# Patient Record
Sex: FEMALE | Race: WHITE | URBAN - METROPOLITAN AREA
[De-identification: names, ages, dates, MRNs, and addresses within clinical notes are randomized per-mention and may not be internally consistent; named-entity substitution may affect disease eponyms.]

---

## 2017-02-10 NOTE — PATIENT DISCUSSION
POAG, OU: INTRAOCULAR PRESSURE IS WITHIN ACCEPTABLE LIMITS. PT INSTRUCTED TO CONTINUE timolol  AND RETURN FOR FOLLOW-UP AS SCHEDULED.

## 2017-04-21 ENCOUNTER — IMPORTED ENCOUNTER (OUTPATIENT)
Dept: URBAN - METROPOLITAN AREA CLINIC 1 | Facility: CLINIC | Age: 67
End: 2017-04-21

## 2017-04-21 PROBLEM — H25.813: Noted: 2017-04-21

## 2017-04-21 PROBLEM — H35.033: Noted: 2017-04-21

## 2017-04-21 PROBLEM — H04.123: Noted: 2017-04-21

## 2017-04-21 PROBLEM — H16.143: Noted: 2017-04-21

## 2017-04-21 PROCEDURE — 92014 COMPRE OPH EXAM EST PT 1/>: CPT

## 2017-04-21 NOTE — PATIENT DISCUSSION
1.  Cataract OU: Observe for now without intervention. The patient was advised to contact us if any change or worsening of vision2. FRANCESCA w/ PEK OU- Increase ATs TID OU Routinely. 3.  Hypertensive Retinopathy OU- Stable continue HTN ControlLetter to PCP Return for an appointment in 1 yr 27 with Dr. Shae Mace.

## 2017-06-05 NOTE — PATIENT DISCUSSION
POAG, OU: INTRAOCULAR PRESSURE IS WITHIN ACCEPTABLE LIMITS. PT INSTRUCTED TO CONTINUE TIMOLOL OU QAM AND RETURN FOR FOLLOW-UP AS SCHEDULED.

## 2017-08-11 NOTE — PATIENT DISCUSSION
New Prescription: Maxitrol (neomycin-polymyxin-dexameth): drops,suspension: 3.5-10,000-0.1 mg/mL-unit/mL-% 1 drop three times a day as directed into affected eye 08-

## 2017-08-18 NOTE — PATIENT DISCUSSION
Continue: Maxitrol (neomycin-polymyxin-dexameth): drops,suspension: 3.5-10,000-0.1 mg/mL-unit/mL-% 1 drop three times a day as directed into affected eye 08-

## 2018-03-22 NOTE — PATIENT DISCUSSION
New Prescription: Maxitrol (neomycin-polymyxin-dexameth): drops,suspension: 3.5-10,000-0.1 mg/mL-unit/mL-% 1 drop four times a day as directed into affected eye 03-

## 2018-04-13 NOTE — PATIENT DISCUSSION
Stopped Today: Maxitrol (neomycin-polymyxin-dexameth): drops,suspension: 3.5-10,000-0.1 mg/mL-unit/mL-% 1 drop four times a day as directed into affected eye 03-

## 2018-04-24 ENCOUNTER — IMPORTED ENCOUNTER (OUTPATIENT)
Dept: URBAN - METROPOLITAN AREA CLINIC 1 | Facility: CLINIC | Age: 68
End: 2018-04-24

## 2018-04-24 PROBLEM — H16.143: Noted: 2018-04-24

## 2018-04-24 PROBLEM — H04.123: Noted: 2018-04-24

## 2018-04-24 PROBLEM — H25.813: Noted: 2018-04-24

## 2018-04-24 PROCEDURE — 92014 COMPRE OPH EXAM EST PT 1/>: CPT

## 2018-04-24 NOTE — PATIENT DISCUSSION
1.  Cataract OU: Observe for now without intervention. The patient was advised to contact us if any change or worsening of vision2. FRANCESCA w/ resolved PEK OU- Cont ATs TID OU Routinely. Letter to PCP Return for an appointment in 1 yr 30 glare with Dr. Tk Matthews.

## 2018-07-05 NOTE — PATIENT DISCUSSION
EYELID LESION, OS:  RECOMMEND BIOPSY WITH SPECIMEN TO PATHOLOGY. RISKS/BENEFITS/ALTERNATIVES DISCUSSED WITH PATIENT.

## 2018-07-05 NOTE — PATIENT DISCUSSION
New Prescription: Maxitrol (neomycin-polymyxin-dexameth): ointment: 3.5-10,000-0.1 mg-unit/g-% 1 a thin layer at bedtime into left eye 07-

## 2018-07-24 NOTE — PATIENT DISCUSSION
POAG, OU: INTRAOCULAR PRESSURE IS WITHIN ACCEPTABLE LIMITS. PT INSTRUCTED TO CONTINUE TIMOLOL OU  AND RETURN FOR FOLLOW-UP AS SCHEDULED.

## 2018-09-10 NOTE — PATIENT DISCUSSION
NO CHANGES ON VISUAL FIELD. CONTINUE CURRENT THERAPY AT THIS TIME.   DO TESTING YEARLY AND FOLLOW UP AS SCHEDULED

## 2019-04-25 ENCOUNTER — IMPORTED ENCOUNTER (OUTPATIENT)
Dept: URBAN - METROPOLITAN AREA CLINIC 1 | Facility: CLINIC | Age: 69
End: 2019-04-25

## 2019-04-25 PROBLEM — H04.123: Noted: 2019-04-25

## 2019-04-25 PROBLEM — H10.45: Noted: 2019-04-25

## 2019-04-25 PROBLEM — H43.812: Noted: 2019-04-25

## 2019-04-25 PROBLEM — H25.813: Noted: 2019-04-25

## 2019-04-25 PROBLEM — H16.143: Noted: 2019-04-25

## 2019-04-25 PROCEDURE — 92014 COMPRE OPH EXAM EST PT 1/>: CPT

## 2019-04-25 PROCEDURE — 92015 DETERMINE REFRACTIVE STATE: CPT

## 2019-04-25 NOTE — PATIENT DISCUSSION
1.  Cataract OU: Observe for now without intervention. The patient was advised to contact us if any change or worsening of vision2. FRANCESCA w/ PEK OU- Recommend ATs TID OU routinely 3. Allergic Conjunctivitis OU -- The condition was  discussed with the patient. Avoidance of allergens and cool compresses were recommended. May use OTC Zaditor BID OU PRN for itching 4. PVD w/o Tear OS- RD precautions. MRX for glasses givenReturn for an appointment in 1 year 30/glare with Dr. Sharlene Howell.

## 2020-04-30 ENCOUNTER — IMPORTED ENCOUNTER (OUTPATIENT)
Dept: URBAN - METROPOLITAN AREA CLINIC 1 | Facility: CLINIC | Age: 70
End: 2020-04-30

## 2020-04-30 PROBLEM — H16.143: Noted: 2020-04-30

## 2020-04-30 PROBLEM — H10.45: Noted: 2020-04-30

## 2020-04-30 PROBLEM — H25.813: Noted: 2020-04-30

## 2020-04-30 PROBLEM — H43.812: Noted: 2020-04-30

## 2020-04-30 PROBLEM — H25.013: Noted: 2020-04-30

## 2020-04-30 PROBLEM — H04.123: Noted: 2020-04-30

## 2020-04-30 PROBLEM — H35.033: Noted: 2020-04-30

## 2020-04-30 PROCEDURE — 92014 COMPRE OPH EXAM EST PT 1/>: CPT

## 2020-04-30 NOTE — PATIENT DISCUSSION
1.  Cataract OU -- Observe for now without intervention. The patient was advised to contact us if any change or worsening of vision2. FRANCESCA w/ increased PEK OU -- Increased PEK noted OU today. Recommend increasing ATs QID OU routinely (previously using BID). 3. Allergic Conjunctivitis OU -- The condition was  discussed with the patient. Avoidance of allergens and cool compresses were recommended. May use OTC Zaditor BID OU PRN for itching 4. PVD w/o Tear OS- RD precautions. Return for an appointment in 6 months for a 10/glare *check K's with Dr. Tk Matthews.

## 2020-10-27 ENCOUNTER — IMPORTED ENCOUNTER (OUTPATIENT)
Dept: URBAN - METROPOLITAN AREA CLINIC 1 | Facility: CLINIC | Age: 70
End: 2020-10-27

## 2020-10-27 PROBLEM — H04.123: Noted: 2020-10-27

## 2020-10-27 PROBLEM — H16.143: Noted: 2020-10-27

## 2020-10-27 PROBLEM — H25.813: Noted: 2020-10-27

## 2020-10-27 PROCEDURE — 92012 INTRM OPH EXAM EST PATIENT: CPT

## 2020-10-27 NOTE — PATIENT DISCUSSION
1.  Cataract OU -- Observe for now without intervention. The patient was advised to contact us if any change or worsening of vision2. FRANCESCA w/ PEK OU -- PEK improved on ATs TID. Continue ATs TID-QID OU routinely. 3. Allergic Conjunctivitis OU -- The condition was  discussed with the patient. Avoidance of allergens and cool compresses were recommended. May use OTC Zaditor BID OU PRN for itching 4. H/o PVD w/o Tear OS Return for an appointment in 6 months for a 30/glare with Dr. Vinicio Lucero.

## 2021-06-28 ENCOUNTER — IMPORTED ENCOUNTER (OUTPATIENT)
Dept: URBAN - METROPOLITAN AREA CLINIC 1 | Facility: CLINIC | Age: 71
End: 2021-06-28

## 2021-06-28 PROBLEM — H10.45: Noted: 2021-06-28

## 2021-06-28 PROBLEM — H04.123: Noted: 2021-06-28

## 2021-06-28 PROBLEM — H25.813: Noted: 2021-06-28

## 2021-06-28 PROBLEM — H16.143: Noted: 2021-06-28

## 2021-06-28 PROBLEM — H43.812: Noted: 2021-06-28

## 2021-06-28 PROCEDURE — 99214 OFFICE O/P EST MOD 30 MIN: CPT

## 2021-06-28 PROCEDURE — 92015 DETERMINE REFRACTIVE STATE: CPT

## 2021-06-28 NOTE — PATIENT DISCUSSION
1.  Cataract OU --  Visually Significant secondary to glare however pt defers phaco at this time. Will cont to observe. 2.  FRANCESCA w/ PEK OU -- PEK/symptoms on ATs TID. Continue ATs TID-QID OU routinely. 3. Allergic Conjunctivitis OU -- The condition was  discussed with the patient. Avoidance of allergens and cool compresses were recommended. 4. PVD w/o Tear OS- RD precautions. Return for an appointment in 6 months for a 10/dfe/glare with Dr. Ivelisse Hagan.

## 2021-10-21 NOTE — PATIENT DISCUSSION
PT WANTS TO PROCEED WITH STANDARD LENS OD THEN OS.   PT UNDERSTANDS THAT A NEW BIFOCAL RX WILL BE GIVEN AFTER SURGERY FOR HER BEST CORRECTED VISION.  PT WANTS TO PROCEED WITH 3 GENERIC EYE DROPS, ALL SENT IN TO HER PHARMACY.  WILL ADD MST PROCEDURE TO CATARACT SURGERY FOR POAG.

## 2021-11-09 NOTE — PROCEDURE NOTE: CLINICAL
PROCEDURE NOTE: A/C Paracentesis OD. Diagnosis: Pseudophakia. Prior to treatment, the risks/benefits/alternatives were discussed. The patient wished to proceed with procedure. Patient tolerated procedure well. There were no complications. Post procedure instructions given. Suhail Glass

## 2021-11-15 NOTE — PATIENT DISCUSSION
PATIENT S/P PHACO OD STILL WITH INCREASED CORNEAL EDEMA, LIKELY TASS. CHANGE KETOROLAC TO PROLENSA QD, INCREASE PRED FORTE TO 6X DAILY. INCREASE RYAN OINTMENT OD TO TID.

## 2021-12-07 NOTE — PATIENT DISCUSSION
PATIENT S/P PHACO OD STILL WITH CORNEAL EDEMA BUT MORE BETTER, LIKELY TASS.  RESTART PROLENSA QD, AND DECREASE PRED FORTE TO QID. DISCONTINUE RYAN OINTMENT OD TO TID.

## 2021-12-15 NOTE — PATIENT DISCUSSION
START MAXITROL DROPS OS TID. SEND RX TO [facility] ON [address] [facility].

## 2022-01-10 ENCOUNTER — PREPPED CHART (OUTPATIENT)
Dept: URBAN - METROPOLITAN AREA CLINIC 1 | Facility: CLINIC | Age: 72
End: 2022-01-10

## 2022-01-10 ENCOUNTER — IMPORTED ENCOUNTER (OUTPATIENT)
Dept: URBAN - METROPOLITAN AREA CLINIC 1 | Facility: CLINIC | Age: 72
End: 2022-01-10

## 2022-01-10 PROBLEM — H04.123: Noted: 2022-01-10

## 2022-01-10 PROBLEM — H35.013: Noted: 2022-01-10

## 2022-01-10 PROBLEM — H10.45: Noted: 2022-01-10

## 2022-01-10 PROBLEM — H25.813: Noted: 2022-01-10

## 2022-01-10 PROBLEM — H16.143: Noted: 2022-01-10

## 2022-01-10 PROCEDURE — 92012 INTRM OPH EXAM EST PATIENT: CPT

## 2022-01-10 PROCEDURE — 92015 DETERMINE REFRACTIVE STATE: CPT

## 2022-01-10 NOTE — PATIENT DISCUSSION
1.  Cataract OU -- Visually Significant secondary to glare OU discussed the risks benefits alternatives and limitations of cataract surgery. The patient stated a full understanding and a desire to proceed with the procedure. The patient will need to return for preop appointment with cataract measurements and to have any additional questions answered and start pre-operative eye drops as directed. Recommend Toric w/ Myopic target will further discuss at pre-op. Phaco PCL OS then OD Otherwise follow-up 6 mos for a 30/glare2. FRANCESCA w/ PEK OU -- Increased PEK w/ ATs BID OU. Recommend increasing ATs to QID OU. 3.  Allergic Conjunctivitis OU -- The condition was  discussed with the patient. Avoidance of allergens and cool compresses were recommended. 4. GR I Athero Vascular Dz OU5. PVD w/o Tear OS -- RD precautions. Return for an appointment AScan/HP with Dr. Ivelisse Hagan.

## 2022-01-14 ENCOUNTER — IMPORTED ENCOUNTER (OUTPATIENT)
Dept: URBAN - METROPOLITAN AREA CLINIC 1 | Facility: CLINIC | Age: 72
End: 2022-01-14

## 2022-01-14 PROBLEM — H25.812: Noted: 2022-01-14

## 2022-01-14 PROCEDURE — 92012 INTRM OPH EXAM EST PATIENT: CPT

## 2022-01-14 PROCEDURE — 92136 OPHTHALMIC BIOMETRY: CPT

## 2022-01-14 NOTE — PATIENT DISCUSSION
1. Cataract OS -- Visually Significant secondary to glare discussed the risks benefits alternatives and limitations of cataract surgery. The patient stated a full understanding and a desire to proceed with the procedure. Discussed with patient if PO Gtts are more than $120 for all three combined when filling at their Pharmacy please call our office to request generic substitutions. Pt came to pre-op appointment today with  and Lifestyle Questionnaire Completed. Pt understands they will need glasses post-op to achieve their best corrected vision. Discussed in length with patient that specs are expected after phaco. Phaco PCL OS (Planning on Toric w/ LenSx)Return for an appointment as scheduled with Dr. Shae Mace.

## 2022-01-19 ENCOUNTER — IMPORTED ENCOUNTER (OUTPATIENT)
Dept: URBAN - METROPOLITAN AREA CLINIC 1 | Facility: CLINIC | Age: 72
End: 2022-01-19

## 2022-01-20 ENCOUNTER — PREPPED CHART (OUTPATIENT)
Dept: URBAN - METROPOLITAN AREA CLINIC 1 | Facility: CLINIC | Age: 72
End: 2022-01-20

## 2022-01-20 ENCOUNTER — IMPORTED ENCOUNTER (OUTPATIENT)
Dept: URBAN - METROPOLITAN AREA CLINIC 1 | Facility: CLINIC | Age: 72
End: 2022-01-20

## 2022-01-20 PROBLEM — Z96.1: Noted: 2022-01-20

## 2022-01-20 PROCEDURE — 99024 POSTOP FOLLOW-UP VISIT: CPT

## 2022-01-20 NOTE — PATIENT DISCUSSION
POD#1 CE/IOL OS (Toric w/ LenSx) doing well. Use Zylet BID OS & Prolensa QD OS: Use all gtts through completion of PO gtt chart regimen/ Per our instructions given to patient.   Post op Warnings Reiterated RTC as scheduled

## 2022-02-05 ASSESSMENT — VISUAL ACUITY
OS_CC: J1
OS_SC: 20/60
OD_BAT: 20/100
OD_SC: 20/100
OS_BAT: 20/100
OD_CC: J2
OD_CC: 20/30
OS_SC: 20/20
OS_CC: 20/30

## 2022-02-05 ASSESSMENT — TONOMETRY
OD_IOP_MMHG: 15
OS_IOP_MMHG: 15

## 2022-02-08 ENCOUNTER — PRE-OP/H&P (OUTPATIENT)
Dept: URBAN - METROPOLITAN AREA CLINIC 1 | Facility: CLINIC | Age: 72
End: 2022-02-08

## 2022-02-08 DIAGNOSIS — H25.811: ICD-10-CM

## 2022-02-08 PROCEDURE — 92136 OPHTHALMIC BIOMETRY: CPT

## 2022-02-08 ASSESSMENT — KERATOMETRY
OS_AXISANGLE_DEGREES: 15
OD_K2POWER_DIOPTERS: 44.25
OS_K1POWER_DIOPTERS: 43.25
OD_AXISANGLE_DEGREES: 089
OD_AXISANGLE2_DEGREES: 179
OD_K1POWER_DIOPTERS: 42.50
OS_AXISANGLE2_DEGREES: 105
OS_K2POWER_DIOPTERS: 44.00

## 2022-02-08 ASSESSMENT — VISUAL ACUITY: OS_SC: 20/25

## 2022-02-08 ASSESSMENT — TONOMETRY: OS_IOP_MMHG: 14

## 2022-02-08 NOTE — PATIENT DISCUSSION
Cataract OD -- Visually Significant (Secondary to glare), discussed the risks, benefits, alternatives, and limitations of cataract surgery. The patient stated a full understanding and a desire to proceed with the procedure. Discussed with patient if post-op drops are more than $120 for all three combined when filling at their Pharmacy, please call our office to request generic substitutions. Phaco PCL OD (planning toric w/ LenSx).

## 2022-02-16 ENCOUNTER — SURGERY/PROCEDURE (OUTPATIENT)
Dept: URBAN - METROPOLITAN AREA CLINIC 1 | Facility: CLINIC | Age: 72
End: 2022-02-16

## 2022-02-16 DIAGNOSIS — H25.811: ICD-10-CM

## 2022-02-16 PROBLEM — Z96.1: Noted: 2022-02-16

## 2022-02-16 PROCEDURE — 66984 XCAPSL CTRC RMVL W/O ECP: CPT

## 2022-02-17 ENCOUNTER — POST-OP (OUTPATIENT)
Dept: URBAN - METROPOLITAN AREA CLINIC 1 | Facility: CLINIC | Age: 72
End: 2022-02-17

## 2022-02-17 DIAGNOSIS — Z96.1: ICD-10-CM

## 2022-02-17 PROCEDURE — 99024 POSTOP FOLLOW-UP VISIT: CPT

## 2022-02-17 ASSESSMENT — KERATOMETRY
OD_AXISANGLE2_DEGREES: 179
OS_K1POWER_DIOPTERS: 43.25
OS_K2POWER_DIOPTERS: 44.00
OS_AXISANGLE_DEGREES: 15
OD_K2POWER_DIOPTERS: 44.25
OD_AXISANGLE_DEGREES: 089
OD_K1POWER_DIOPTERS: 42.50
OS_AXISANGLE2_DEGREES: 105

## 2022-02-17 ASSESSMENT — TONOMETRY
OS_IOP_MMHG: 13
OD_IOP_MMHG: 13

## 2022-02-17 ASSESSMENT — VISUAL ACUITY
OD_SC: 20/20
OS_SC: 20/20

## 2022-02-17 NOTE — PATIENT DISCUSSION
Use all (Prolensa QD OD & Zylet BID OD) gtts through completion of PO gtt chart regimen/ Per our instructions given to patient.

## 2022-03-14 ENCOUNTER — POST-OP (OUTPATIENT)
Dept: URBAN - METROPOLITAN AREA CLINIC 1 | Facility: CLINIC | Age: 72
End: 2022-03-14

## 2022-03-14 DIAGNOSIS — Z96.1: ICD-10-CM

## 2022-03-14 PROCEDURE — 99024 POSTOP FOLLOW-UP VISIT: CPT

## 2022-03-14 ASSESSMENT — KERATOMETRY
OD_K2POWER_DIOPTERS: 44.25
OD_K1POWER_DIOPTERS: 42.50
OS_K1POWER_DIOPTERS: 43.25
OS_AXISANGLE_DEGREES: 15
OD_K2POWER_DIOPTERS: 44.25
OS_K2POWER_DIOPTERS: 44.00
OS_K2POWER_DIOPTERS: 44.00
OD_AXISANGLE_DEGREES: 089
OS_K1POWER_DIOPTERS: 43.25
OD_AXISANGLE_DEGREES: 089
OD_AXISANGLE2_DEGREES: 179
OD_AXISANGLE2_DEGREES: 179
OS_AXISANGLE_DEGREES: 15
OD_K1POWER_DIOPTERS: 42.50
OS_AXISANGLE2_DEGREES: 105
OS_AXISANGLE2_DEGREES: 105

## 2022-03-14 ASSESSMENT — VISUAL ACUITY
OD_SC: 20/25
OS_SC: 20/40

## 2022-03-14 ASSESSMENT — TONOMETRY
OS_IOP_MMHG: 13
OD_IOP_MMHG: 14

## 2022-04-02 ASSESSMENT — VISUAL ACUITY
OD_GLARE: 20/100
OD_CC: J1
OD_SC: 20/30
OS_SC: 20/20
OD_CC: 20/100
OD_SC: 20/30
OS_SC: 20/25-2
OS_CC: J1
OS_CC: 20/20
OS_GLARE: 20/100
OD_GLARE: 20/100
OS_SC: 20/20
OD_CC: J2
OD_SC: 20/20
OS_GLARE: 20/100
OS_GLARE: 20/100
OS_CC: J1
OS_SC: 20/30
OD_SC: 20/20
OD_CC: 20/100
OD_CC: J1
OS_CC: 20/50
OS_GLARE: 20/100
OS_CC: 20/60
OD_CC: J1
OS_SC: 20/30
OS_SC: 20/40
OS_CC: J1
OD_GLARE: 20/100
OS_GLARE: 20/100
OS_CC: J1
OS_CC: J1
OS_SC: 20/25-2
OD_SC: 20/30
OD_SC: 20/40
OD_SC: 20/30
OD_GLARE: 20/100
OD_GLARE: 20/100
OD_CC: J1

## 2022-04-02 ASSESSMENT — TONOMETRY
OD_IOP_MMHG: 15
OS_IOP_MMHG: 14
OS_IOP_MMHG: 14
OD_IOP_MMHG: 15
OD_IOP_MMHG: 15
OS_IOP_MMHG: 15
OD_IOP_MMHG: 14
OS_IOP_MMHG: 16
OD_IOP_MMHG: 16
OS_IOP_MMHG: 14
OS_IOP_MMHG: 15
OS_IOP_MMHG: 14
OS_IOP_MMHG: 16
OD_IOP_MMHG: 14
OD_IOP_MMHG: 15

## 2022-10-03 NOTE — PATIENT DISCUSSION
IOP IN TARGET RANGE - CONTINUE TIMOLOL QAM OU.   VISUAL FIELD IS WNL.  NO CHANGES IN THERAPY AT THIS TIME.

## 2023-01-09 NOTE — PATIENT DISCUSSION
SEND IN MAXITROL OINTMENT TO [facility] AT [address] [place], [place] [address], [place].

## 2024-08-02 ENCOUNTER — COMPREHENSIVE EXAM (OUTPATIENT)
Dept: URBAN - METROPOLITAN AREA CLINIC 1 | Facility: CLINIC | Age: 74
End: 2024-08-02

## 2024-08-02 DIAGNOSIS — H10.13: ICD-10-CM

## 2024-08-02 DIAGNOSIS — H16.143: ICD-10-CM

## 2024-08-02 DIAGNOSIS — H04.123: ICD-10-CM

## 2024-08-02 DIAGNOSIS — H26.493: ICD-10-CM

## 2024-08-02 DIAGNOSIS — H43.812: ICD-10-CM

## 2024-08-02 DIAGNOSIS — H35.033: ICD-10-CM

## 2024-08-02 PROCEDURE — 92015 DETERMINE REFRACTIVE STATE: CPT

## 2024-08-02 PROCEDURE — 99214 OFFICE O/P EST MOD 30 MIN: CPT

## 2024-08-02 ASSESSMENT — KERATOMETRY
OD_K1POWER_DIOPTERS: 42.50
OS_K2POWER_DIOPTERS: 44.00
OS_K1POWER_DIOPTERS: 43.25
OD_AXISANGLE_DEGREES: 089
OS_AXISANGLE_DEGREES: 15
OD_K2POWER_DIOPTERS: 44.25
OD_AXISANGLE2_DEGREES: 179
OS_AXISANGLE2_DEGREES: 105

## 2024-08-02 ASSESSMENT — TONOMETRY
OD_IOP_MMHG: 14
OS_IOP_MMHG: 14

## 2024-08-02 ASSESSMENT — VISUAL ACUITY
OD_BAT: 20/60
OD_SC: 20/30
OS_BAT: 20/60
OS_SC: 20/30

## 2024-08-23 ENCOUNTER — CLINIC PROCEDURE ONLY (OUTPATIENT)
Dept: URBAN - METROPOLITAN AREA CLINIC 1 | Facility: CLINIC | Age: 74
End: 2024-08-23

## 2024-08-23 DIAGNOSIS — Z96.1: ICD-10-CM

## 2024-08-23 DIAGNOSIS — H26.493: ICD-10-CM

## 2024-08-23 PROCEDURE — 66821 AFTER CATARACT LASER SURGERY: CPT

## 2024-08-23 ASSESSMENT — KERATOMETRY
OD_AXISANGLE2_DEGREES: 179
OD_AXISANGLE_DEGREES: 089
OS_AXISANGLE2_DEGREES: 105
OS_K1POWER_DIOPTERS: 43.25
OD_K2POWER_DIOPTERS: 44.25
OS_K2POWER_DIOPTERS: 44.00
OD_K1POWER_DIOPTERS: 42.50
OS_AXISANGLE_DEGREES: 15

## 2024-09-13 ENCOUNTER — CLINIC PROCEDURE ONLY (OUTPATIENT)
Dept: URBAN - METROPOLITAN AREA CLINIC 1 | Facility: CLINIC | Age: 74
End: 2024-09-13

## 2024-09-13 DIAGNOSIS — H26.492: ICD-10-CM

## 2024-09-13 DIAGNOSIS — Z96.1: ICD-10-CM

## 2024-09-13 PROCEDURE — 66821 AFTER CATARACT LASER SURGERY: CPT | Mod: 79,LT

## 2024-09-27 ENCOUNTER — POST-OP (OUTPATIENT)
Dept: URBAN - METROPOLITAN AREA CLINIC 1 | Facility: CLINIC | Age: 74
End: 2024-09-27

## 2024-09-27 DIAGNOSIS — Z96.1: ICD-10-CM

## 2024-09-27 DIAGNOSIS — Z98.890: ICD-10-CM

## 2025-08-29 ENCOUNTER — COMPREHENSIVE EXAM (OUTPATIENT)
Age: 75
End: 2025-08-29

## 2025-08-29 DIAGNOSIS — Z96.1: ICD-10-CM

## 2025-08-29 DIAGNOSIS — H10.13: ICD-10-CM

## 2025-08-29 DIAGNOSIS — H04.123: ICD-10-CM

## 2025-08-29 DIAGNOSIS — H16.143: ICD-10-CM

## 2025-08-29 DIAGNOSIS — H35.033: ICD-10-CM

## 2025-08-29 PROCEDURE — 99214 OFFICE O/P EST MOD 30 MIN: CPT
